# Patient Record
Sex: MALE | Race: WHITE | NOT HISPANIC OR LATINO | Employment: FULL TIME | URBAN - NONMETROPOLITAN AREA
[De-identification: names, ages, dates, MRNs, and addresses within clinical notes are randomized per-mention and may not be internally consistent; named-entity substitution may affect disease eponyms.]

---

## 2021-06-03 ENCOUNTER — APPOINTMENT (OUTPATIENT)
Dept: GENERAL RADIOLOGY | Facility: OTHER | Age: 21
End: 2021-06-03
Attending: PHYSICIAN ASSISTANT

## 2021-06-03 ENCOUNTER — HOSPITAL ENCOUNTER (EMERGENCY)
Facility: OTHER | Age: 21
Discharge: HOME | End: 2021-06-03
Attending: PHYSICIAN ASSISTANT | Admitting: PHYSICIAN ASSISTANT

## 2021-06-03 VITALS
BODY MASS INDEX: 24.5 KG/M2 | WEIGHT: 175 LBS | SYSTOLIC BLOOD PRESSURE: 118 MMHG | HEART RATE: 78 BPM | DIASTOLIC BLOOD PRESSURE: 84 MMHG | TEMPERATURE: 97.7 F | RESPIRATION RATE: 16 BRPM | HEIGHT: 71 IN | OXYGEN SATURATION: 98 %

## 2021-06-03 DIAGNOSIS — S81.811A LACERATION OF RIGHT LOWER EXTREMITY, INITIAL ENCOUNTER: ICD-10-CM

## 2021-06-03 PROCEDURE — 96365 THER/PROPH/DIAG IV INF INIT: CPT | Mod: XU | Performed by: PHYSICIAN ASSISTANT

## 2021-06-03 PROCEDURE — 12002 RPR S/N/AX/GEN/TRNK2.6-7.5CM: CPT | Performed by: PHYSICIAN ASSISTANT

## 2021-06-03 PROCEDURE — 99284 EMERGENCY DEPT VISIT MOD MDM: CPT | Mod: 25 | Performed by: PHYSICIAN ASSISTANT

## 2021-06-03 PROCEDURE — 90471 IMMUNIZATION ADMIN: CPT | Performed by: PHYSICIAN ASSISTANT

## 2021-06-03 PROCEDURE — 73560 X-RAY EXAM OF KNEE 1 OR 2: CPT | Mod: RT

## 2021-06-03 PROCEDURE — 99283 EMERGENCY DEPT VISIT LOW MDM: CPT | Mod: 25 | Performed by: PHYSICIAN ASSISTANT

## 2021-06-03 PROCEDURE — 90715 TDAP VACCINE 7 YRS/> IM: CPT | Performed by: PHYSICIAN ASSISTANT

## 2021-06-03 PROCEDURE — 250N000011 HC RX IP 250 OP 636: Performed by: PHYSICIAN ASSISTANT

## 2021-06-03 RX ORDER — FENTANYL CITRATE 50 UG/ML
50 INJECTION, SOLUTION INTRAMUSCULAR; INTRAVENOUS ONCE
Status: COMPLETED | OUTPATIENT
Start: 2021-06-03 | End: 2021-06-03

## 2021-06-03 RX ORDER — CEFAZOLIN SODIUM 2 G/100ML
2 INJECTION, SOLUTION INTRAVENOUS ONCE
Status: COMPLETED | OUTPATIENT
Start: 2021-06-03 | End: 2021-06-03

## 2021-06-03 RX ORDER — CEPHALEXIN 500 MG/1
500 CAPSULE ORAL 4 TIMES DAILY
Qty: 40 CAPSULE | Refills: 0 | Status: SHIPPED | OUTPATIENT
Start: 2021-06-03 | End: 2021-06-13

## 2021-06-03 RX ADMIN — CEFAZOLIN SODIUM 2 G: 2 INJECTION, SOLUTION INTRAVENOUS at 12:26

## 2021-06-03 RX ADMIN — TETANUS TOXOID, REDUCED DIPHTHERIA TOXOID AND ACELLULAR PERTUSSIS VACCINE, ADSORBED 0.5 ML: 5; 2.5; 8; 8; 2.5 SUSPENSION INTRAMUSCULAR at 12:32

## 2021-06-03 RX ADMIN — FENTANYL CITRATE 50 MCG: 50 INJECTION, SOLUTION INTRAMUSCULAR; INTRAVENOUS at 13:40

## 2021-06-03 ASSESSMENT — MIFFLIN-ST. JEOR: SCORE: 1830.92

## 2021-06-03 NOTE — ED TRIAGE NOTES
EMS Arrival Note  ________________________________  Lazarus Winter is a 20 year old Male that arrives via [facility] ambulance service from scene of accident  Pre hospital clinical presentation per patient  and EMS personnel includes that patient was cutting wood and his coworker was using a power saw and it kicked back striking his right leg. Acquired a large deep laceration to the front of his right thigh. Bleeding controlled.   Pre hospital personnel report vital signs of:  B/P 130/80; HR 80, RR 20;SpO2 98  Pre Hospital Cardiac rhythm reported as Normal Sinus    Patient arrives with:  GCS Total = 15  Airway intact  Breathing Assessment Normal  Circulation Assessment Normal  Patient arrives with a 18 gauge IV at his right anticubital     Placed in room 908, gowned, warm blanket provided, side rails up,  ID verified and band placed, and call light within reach.       Previous living situation Other:

## 2021-06-03 NOTE — ED PROVIDER NOTES
"  History     Chief Complaint   Patient presents with     Laceration     HPI  Lazarus Winter is a 20 year old male who presents to the emergency department for evaluation with a chain saw injury in the lateral aspect of his right distal thigh.  It is superficial in nature.  He says that he was cutting down a tree a kick back and hit his leg just above the knee.  Patient is amatory and has full extension he has a sharp sensation 4 out of 10 with pain he does not have any distal anesthesia and bleeding is controlled.  He does not have any headaches dizziness no visual services cough no neck pain chest pain shortness of breath no abdominal pain diarrhea constipation no loss of bowel bladder function or additional trauma that was described above    Allergies:  No Known Allergies    Problem List:    There are no active problems to display for this patient.       Past Medical History:    No past medical history on file.    Past Surgical History:    No past surgical history on file.    Family History:    No family history on file.    Social History:  Marital Status:    Social History     Tobacco Use     Smoking status: Not on file   Substance Use Topics     Alcohol use: Not on file     Drug use: Not on file        Medications:    cephALEXin (KEFLEX) 500 MG capsule          Review of Systems   Please see HPI for pertinent positives and negatives.  All other systems reviewed and found to be negative.      Physical Exam   BP: 135/86  Pulse: 66  Temp: 97.7  F (36.5  C)  Resp: 18  Height: 180.3 cm (5' 11\")  Weight: 79.4 kg (175 lb)  SpO2: 98 %      Physical Exam   Exam:  Constitutional: healthy, alert and no distress  Head: Normocephalic.    Neck: Neck supple.    ENT: ENT exam normal, no neck nodes or sinus tenderness  Cardiovascular:  RRR. No murmurs, clicks gallops or rub  Respiratory:  Lungs clear  Gastrointestinal: Abdomen soft, non-tender. BS normal. No masses, organomegaly  : Deferred  Musculoskeletal: Right lower " extremity has full range of motion both with flexion and extension.  He does have a superficial 6 cm laceration right lateral thigh.  Patient does not have any distal anesthesia.  He has full range of motion there is no foot drop pulses are present distally bilaterally.  Patient is ambulatory  Skin: Sick centimeter noncomplex superficial right thigh laceration there is some adipose tissue involved but there appears to be no muscle involvement.  Neurologic: Gait normal. Reflexes normal and symmetric. Sensation grossly WNL.  Psychiatric: mentation appears normal and affect normal/bright GCS 15         ED Course        [facility]    -Laceration Repair    Date/Time: 6/3/2021 1:50 PM  Performed by: Gil Patel PA-C  Authorized by: Gil Patel PA-C       ANESTHESIA (see MAR for exact dosages):     Anesthesia method:  Local infiltration    Local anesthetic:  Bupivacaine 0.5% w/o epi  LACERATION DETAILS     Location:  Leg    Leg location:  R knee    Length (cm):  6    REPAIR TYPE:     Repair type:  Simple      EXPLORATION:     Hemostasis achieved with:  Direct pressure    Wound exploration: wound explored through full range of motion and entire depth of wound probed and visualized      Wound extent: areolar tissue violated      Wound extent: fascia not violated, no foreign body, no signs of injury and no tendon damage      Contaminated: no      TREATMENT:     Area cleansed with:  Saline    Amount of cleaning:  Extensive    Irrigation solution:  Sterile saline    Irrigation volume:  2000    Irrigation method:  Pressure wash    Visualized foreign bodies/material removed: no      SKIN REPAIR     Repair method:  Sutures    Suture size:  3-0    Suture material:  Nylon    Suture technique:  Simple interrupted    Number of sutures:  8    APPROXIMATION     Approximation:  Close    POST-PROCEDURE DETAILS     Dressing:  Antibiotic ointment and non-adherent dressing           Results for orders  placed or performed during the hospital encounter of 06/03/21 (from the past 24 hour(s))   XR Knee Port Right 1/2 Views    Narrative    XR KNEE PORT RIGHT 1/2 VIEWS, 6/3/2021 12:20 PM    History: Male, age 20 years; Chainsaw injury    Comparison: None.    Technique: Two views .    FINDINGS: Bones are normally mineralized. There is no evidence of  fracture. There is a soft tissue defect anteriorly and laterally in  the distal thigh. Soft tissues are otherwise normal.      Impression    IMPRESSION:   No evidence of acute bony abnormality.    Soft tissue defect in the anterior/anterolateral aspect of the distal  thigh.    NETTE MCKINNON MD       Medications   Tdap (tetanus-diptheria-acell pertussis) (BOOSTRIX) injection 0.5 mL (0.5 mLs Intramuscular Given 6/3/21 1232)   ceFAZolin (ANCEF) intermittent infusion 2 g in 100 mL dextrose PRE-MIX (0 g Intravenous Stopped 6/3/21 1351)   fentaNYL (PF) (SUBLIMAZE) injection 50 mcg (50 mcg Intravenous Given 6/3/21 1340)       Assessments & Plan (with Medical Decision Making)     I have reviewed the nursing notes.    I have reviewed the findings, diagnosis, plan and need for follow up with the patient.  Differential diagnosis considerations included arterial laceration, fracture, foreign body, strain, sprain, fracture, contusion, dislocation, internal ligament derangement, disk herniation-radiculopathy, arthritis, bursitis, tendonitis, DVT, vascular occlusion, claudication, compartment syndrome, cellulitis.  Lazarus presents for evaluation at this time the patient does not meet any criteria for trauma activation but with injuries and the mechanism it raises suspicion for subtle or serious injuries the patient did have a prompt trauma evaluation.  Pleasant gentleman who presents for evaluation of a superficial right leg laceration.  It was sutured as noted above.  And 2 g of Ancef were provided he did have a thorough irrigation outplace him on Keflex at home.  I would encourage  him close follow-up in 48 hours for wound check and then stitches out in 10 days.  The risk and benefit of this laceration were discussed including infection foreign body retention deep tissue laceration involving vessels nerves and tendon and scarring he understands that this is a significant wound and he will do his best to follow-up accordingly and keep the area clean and dry.  I explained my diagnostic considerations and recommendations and the patient voiced an understanding and was in agreement with the treatment plan. All questions were answered. We discussed potential side effects of any prescribed or recommended therapies, as well as expectations for response to treatments.       New Prescriptions    CEPHALEXIN (KEFLEX) 500 MG CAPSULE    Take 1 capsule (500 mg) by mouth 4 times daily for 10 days       Final diagnoses:   Laceration of right lower extremity, initial encounter       6/3/2021   [facility]     Gil Patel PA-C  06/03/21 1513

## 2021-06-03 NOTE — DISCHARGE INSTRUCTIONS
FOLLOW UP IN 48 HOURS FOR A WOUND CHECK SUTURES OUT IN 10 DAYS   IF SYMPTOMS WORSEN OR IF YOU HAVE FURTHER CONCERNS COME BACK TO THE ER     KEEP AREA CLEAN AND DRY BUT USING SOAP AND WATER TO KEEP IT CLEAN IS IMPORTANT.     YOU MAY APPLY VITAMIN E SOLUTION TO THE WOUND IT MAY HELP WITH SOME SCARING     THE DISCHARGE INSTRUCTIONS ARE INTENDED AS A COMPLEMENT TO AND NOT A REPLACEMENT FOR THE VERBAL INSTRUCTIONS THAT I HAVE PROVIDED YOU TONIGHT. AFTER GOING OVER THE PLAN OF CARE TONIGHT AND PROVIDING YOU WITH THE VERBAL INSTRUCTIONS AT DISCHARGE YOU HAVE HAD THE OPPORTUNITY TO ASK FURTHER QUESTIONS AND TO CLARIFY UNCERTAINTIES. SINCE YOU HAVE NO FURTHER QUESTIONS PLEASE HAVE A WONDERFUL SAFE EVENING THANK YOU.     YOUR WOUNDS WERE IRRIGATED, EXPLORED AND CLEANED SEVERAL TIMES TO REMOVE ANY MATERIAL. THERE IS ALWAYS A CHANCE THAT THERE STILL MAYBE SOME SMALL PIECES THAT WERE NOT ABLE TO BE REMOVED. IF THAT SHOULD BE THE CASE IT MAY GET IN INFECTED. IT IS IMPORTANT FOR YOU FOLLOW UP AS ADVISED.       WOUND AND SUTURE CARE     GENERAL INFORMATION:    Suturing (stitching) of cuts is done to minimize scarring and to help prevent infection.  Some discomfort and swelling is to be expected following this injury.  It will take time for the wound to heal completely, so be gentle with the injured area.  It is normal for stitches to itch after about five days, but it is important not to scratch the area.    TREATMENT/SPECIAL INSTRUCTIONS:    1. If your cut starts to bleed, hold firm pressure over the area with a clean bandage or cloth.  Should this not stop the bleeding, see your family physician or return to the Emergency Trauma Center.    2. Take Tylenol or Aspirin for mild pain relief after the numbness wears off.  The numbness may last several hours.    3. Keep the area clean and dry for 48 hours.  For cuts on the scalp, you may wash your hair with clean tap water (not bath water), but avoid scrubbing the sutured area.  If the  area gets dirty, gently cleanse it with soap and water, or hydrogen peroxide.  Fluid often weeps from a sutured wound, forming a scab, and can make stitches difficult to remove.  By rubbing gently between the stitches daily with a cotton swab dipped in hydrogen peroxide, you can help prevent this scab formation.  Do not clean wounds if steri-strips were applied over the cut.    4. Keep the area covered with a dressing or Band-Aid, as directed by the physician.  Change the dressing if it becomes damp or soiled.    5. Closely watch the area for signs of infection.  Some examples of infection are:    =Excessive swelling, redness or pain.  =Area feels warm to touch.  =Pus-like drainage.  =Elevated temperature (fever).  =A throbbing sensation in the wound.  =A reddened streak extending from the laceration.    If any of the above occur, see your family physician or return to the Emergency Trauma Center.    6. You should have your sutures (stitches) removed in _______ days.    7. If a tetanus toxoid injection was given, keep a record of it at home.  In most situations, a tetanus toxoid injection is good for 10 years.    8. A cut takes a year to fully heal.  To achieve the best cosmetic result (minimize scarring), particularly on the face, avoid direct sunlight exposure to the wound for one year.  Either keep the wound covered or apply a sunscreen with an SPF of 15 or greater.    Should you have further questions or problems, please feel free to contact your family physician or call the Emergency Trauma Center      .PATRIC

## 2021-06-05 ENCOUNTER — OFFICE VISIT (OUTPATIENT)
Dept: FAMILY MEDICINE | Facility: OTHER | Age: 21
End: 2021-06-05
Attending: NURSE PRACTITIONER

## 2021-06-05 VITALS
HEIGHT: 70 IN | SYSTOLIC BLOOD PRESSURE: 124 MMHG | RESPIRATION RATE: 18 BRPM | TEMPERATURE: 96.9 F | OXYGEN SATURATION: 95 % | DIASTOLIC BLOOD PRESSURE: 66 MMHG | WEIGHT: 204.5 LBS | BODY MASS INDEX: 29.28 KG/M2 | HEART RATE: 63 BPM

## 2021-06-05 DIAGNOSIS — Z51.89 ENCOUNTER FOR POST-TRAUMATIC WOUND CHECK: Primary | ICD-10-CM

## 2021-06-05 PROCEDURE — 99212 OFFICE O/P EST SF 10 MIN: CPT | Performed by: NURSE PRACTITIONER

## 2021-06-05 SDOH — HEALTH STABILITY: MENTAL HEALTH: HOW OFTEN DO YOU HAVE A DRINK CONTAINING ALCOHOL?: NEVER

## 2021-06-05 ASSESSMENT — PAIN SCALES - GENERAL: PAINLEVEL: NO PAIN (0)

## 2021-06-05 ASSESSMENT — MIFFLIN-ST. JEOR: SCORE: 1952.83

## 2021-06-05 NOTE — NURSING NOTE
"Chief Complaint   Patient presents with     Wound Check     Patient is here to have his stitches checked. Patient states he was injured on Thursday and had stitches placed in the ED. Patient denies pain.     Initial /66   Pulse 63   Temp 96.9  F (36.1  C) (Tympanic)   Resp 18   Ht 1.784 m (5' 10.25\")   Wt 92.8 kg (204 lb 8 oz)   SpO2 95%   BMI 29.13 kg/m   Estimated body mass index is 29.13 kg/m  as calculated from the following:    Height as of this encounter: 1.784 m (5' 10.25\").    Weight as of this encounter: 92.8 kg (204 lb 8 oz).  Medication Reconciliation: complete    Brittany Humphrey LPN  "

## 2021-06-05 NOTE — PROGRESS NOTES
"HPI:    Lazarus Winter is a 20 year old male  who presents to [facility] today for wound check    Patient was seen 2 days ago in the ER for laceration to his right distal thigh from a chainsaw injury and is status post suture placement.  Patient returns to [facility] today for wound check just to ensure that he is not having any infection.  Patient states overall he is doing well and denies any current concerns.  Patient denies any noted redness, swelling, drainage or increased pain.  Patient denies fevers.  Patient was prescribed cephalexin 4 times daily for 10 days and patient is taking that.  Patient states he is following his charge instructions including keeping the wound clean and dry and avoiding swimming.        History reviewed. No pertinent past medical history.  History reviewed. No pertinent surgical history.  Social History     Tobacco Use     Smoking status: Never Smoker     Smokeless tobacco: Never Used   Substance Use Topics     Alcohol use: Never     Frequency: Never     Current Outpatient Medications   Medication Sig Dispense Refill     cephALEXin (KEFLEX) 500 MG capsule Take 1 capsule (500 mg) by mouth 4 times daily for 10 days 40 capsule 0     No Known Allergies      Past medical history, past surgical history, current medications and allergies reviewed and accurate to the best of my knowledge.        ROS:  Refer to HPI    /66   Pulse 63   Temp 96.9  F (36.1  C) (Tympanic)   Resp 18   Ht 1.784 m (5' 10.25\")   Wt 92.8 kg (204 lb 8 oz)   SpO2 95%   BMI 29.13 kg/m      EXAM:  General Appearance: Well appearing adolescent male, appropriate appearance for age. No acute distress  Musculoskeletal:  Equal movement of bilateral upper extremities.  Equal movement of bilateral lower extremities.  Mild limping gait.  Patient is ambulating with crutches.  Dermatological: Right distal thigh with well approximated 6 cm laceration intact with 8 sutures, no surrounding erythema, no bruising, no " active drainage or bleeding, no warmth, no tenderness to palpation.  Psychological: normal affect, alert, oriented, and pleasant.               ASSESSMENT/PLAN:    I have reviewed the nursing notes.  I have reviewed the findings, diagnosis, plan and need for follow up with the patient.    1. Encounter for post-traumatic wound check    Patient is 2 days status post suture placement of laceration to his right distal thigh from a chainsaw injury.  Wound is well approximated without any signs or symptoms of infection.    Discussed with patient signs and symptoms of infection to watch for and indications to follow-up.  Discussed wound care including keeping the incision clean and dry.  Recommend washing the incision and surrounding skin once to twice daily with soapy water.  Apply antibiotic ointment after washing.  May leave open to air or may cover with dry dressing.  Directed patient to avoid swimming especially in lakes this time of year until sutures have been removed and laceration is fully healed.    Instructed patient to follow-up 14 days from date of suture placement to have sutures removed.          I explained my diagnostic considerations and recommendations to the patient, who voiced understanding and agreement with the treatment plan. All questions were answered. We discussed potential side effects of any prescribed or recommended therapies, as well as expectations for response to treatments.

## (undated) RX ORDER — FENTANYL CITRATE 50 UG/ML
INJECTION, SOLUTION INTRAMUSCULAR; INTRAVENOUS
Status: DISPENSED
Start: 2021-06-03

## (undated) RX ORDER — SODIUM CHLORIDE 9 MG/ML
INJECTION, SOLUTION INTRAVENOUS
Status: DISPENSED
Start: 2021-06-03